# Patient Record
Sex: FEMALE | Race: NATIVE HAWAIIAN OR OTHER PACIFIC ISLANDER | Employment: STUDENT | URBAN - METROPOLITAN AREA
[De-identification: names, ages, dates, MRNs, and addresses within clinical notes are randomized per-mention and may not be internally consistent; named-entity substitution may affect disease eponyms.]

---

## 2024-07-25 ENCOUNTER — OFFICE VISIT (OUTPATIENT)
Dept: ORTHOPEDIC SURGERY | Age: 19
End: 2024-07-25

## 2024-07-25 VITALS — HEIGHT: 64 IN | BODY MASS INDEX: 19.97 KG/M2 | WEIGHT: 117 LBS

## 2024-07-25 DIAGNOSIS — M25.561 ACUTE PAIN OF RIGHT KNEE: ICD-10-CM

## 2024-07-25 DIAGNOSIS — M65.9 SYNOVITIS OF RIGHT FOOT: ICD-10-CM

## 2024-07-25 DIAGNOSIS — M22.2X1 PATELLOFEMORAL PAIN SYNDROME OF RIGHT KNEE: ICD-10-CM

## 2024-07-25 DIAGNOSIS — M79.671 RIGHT FOOT PAIN: Primary | ICD-10-CM

## 2024-07-25 DIAGNOSIS — M21.41 PES PLANUS OF RIGHT FOOT: ICD-10-CM

## 2024-07-25 PROCEDURE — MISCD110 LATERAL STABILIZER: Performed by: FAMILY MEDICINE

## 2024-07-25 RX ORDER — METHYLPREDNISOLONE 4 MG/1
TABLET ORAL
Qty: 21 KIT | Refills: 0 | Status: SHIPPED | OUTPATIENT
Start: 2024-07-25

## 2024-07-25 RX ORDER — MELOXICAM 15 MG/1
15 TABLET ORAL DAILY
Qty: 30 TABLET | Refills: 3 | Status: SHIPPED | OUTPATIENT
Start: 2024-07-25

## 2024-07-25 NOTE — PATIENT INSTRUCTIONS
Take Medrol first for 6 days. This is a steroid pack. Flip the package over to the foil side and the directions will tell you to start with 6 pills the first day, 5 pills the second day, etc. Please do not take any other anti-inflammatories with the medrol dose esther as this can upset your stomach. If something else is needed, you may take extra strength tylenol.     Once you are finished with the medrol, then you may re-start or start your anti-inflammatory: MELOXICAM 1X/DAY

## 2024-07-25 NOTE — PROGRESS NOTES
obtained today and does not show evidence of obvious acute osseous injury or obvious signs of stress injury.  Right knee AP lateral and sunrise films were obtained today and does show mild tilt on sunrise view without evidence of high-grade degenerative changes or osteochondral injuries.        Assessment : #1.  Nearly 1 year status post persistent symptomatic right great toe and plantar foot pain possibly suggesting plantar plate injury/sesamoiditis with overpronation.  2.  2 years status post episodic symptomatic right knee pain with suspected patellofemoral compression syndrome and overpronation.    Impression:  Encounter Diagnoses   Name Primary?    Right foot pain Yes    Acute pain of right knee     Synovitis of right foot     Pes planus of right foot     Patellofemoral pain syndrome of right knee        Office Procedures:  Orders Placed This Encounter   Procedures    Breg Lateral Stabilizer Knee Brace $60     Patient was supplied a Breg Lateral Stabilizer.  This retail item was supplied to provide functional support and assist in protecting the affected area.      Verbal and written instructions for the use of and application of this item were provided.  The patient was educated and fit by a healthcare professional with expert knowledge and specialization in brace application.  They were instructed to contact the office immediately should the equipment result in increased pain, decreased sensation, increased swelling or worsening of the condition.    XR KNEE RIGHT (3 VIEWS)     Standing Status:   Future     Number of Occurrences:   1     Standing Expiration Date:   7/25/2025     Order Specific Question:   Reason for exam:     Answer:   pain    XR FOOT RIGHT (MIN 3 VIEWS)     Standing Status:   Future     Number of Occurrences:   1     Standing Expiration Date:   7/25/2025     Order Specific Question:   Reason for exam:     Answer:   pain    MRI FOOT RIGHT WO CONTRAST     Standing Status:   Future     Standing

## 2024-08-01 ENCOUNTER — TELEPHONE (OUTPATIENT)
Dept: ORTHOPEDIC SURGERY | Age: 19
End: 2024-08-01

## 2024-08-01 NOTE — TELEPHONE ENCOUNTER
Left voicemail for patient that their MRI has been authorized and that they can call and schedule scan at their convenience. Also told them that they can call and schedule a f/u with Dr. Wesley once they have MRI scheduled, leaving at least 2-3 days for our office to receive their results.

## 2024-08-08 ENCOUNTER — OFFICE VISIT (OUTPATIENT)
Dept: ORTHOPEDIC SURGERY | Age: 19
End: 2024-08-08
Payer: COMMERCIAL

## 2024-08-08 DIAGNOSIS — M65.9 SYNOVITIS OF RIGHT FOOT: ICD-10-CM

## 2024-08-08 DIAGNOSIS — M79.671 RIGHT FOOT PAIN: ICD-10-CM

## 2024-08-08 DIAGNOSIS — M21.41 PES PLANUS OF RIGHT FOOT: ICD-10-CM

## 2024-08-08 DIAGNOSIS — M25.561 ACUTE PAIN OF RIGHT KNEE: Primary | ICD-10-CM

## 2024-08-08 DIAGNOSIS — S93.521A TURF TOE OF RIGHT FOOT: ICD-10-CM

## 2024-08-08 DIAGNOSIS — M22.2X1 PATELLOFEMORAL PAIN SYNDROME OF RIGHT KNEE: ICD-10-CM

## 2024-08-08 PROCEDURE — 99213 OFFICE O/P EST LOW 20 MIN: CPT | Performed by: FAMILY MEDICINE

## 2024-08-08 NOTE — PROGRESS NOTES
Chief Complaint  Results (TR MRI FOOT )    FU ongoing chronic right anterior knee pain and progressive persistent right great toe pain with possible turf toe.  Review of right foot imaging    Follow-up right knee suspected patellofemoral compression syndrome    History of Present Illness:  Jimmy Mina is a 19 y.o. female who is a very pleasant and elijah  for [employer] who is originally from [place] and is being seen today upon self-referral and referral from Diomedes Mac her boyfriend for evaluation of ongoing pain to her right foot particular big toe and ongoing pain to her right knee.  Once again she does play [employer] in lacrosse and has noticed significant pain of almost a year to the plantar aspect of her right great toe.  She does not recall any specific history of injury or new activity and was treated with a boot for several weeks with improvement of her symptoms however after stopping the boot she did have recurrence of pain which is still quite problematic with a constant ache at 3 to 4-10 but forceful running and toeing off can produce 7-8 out of 10 pain.  She has been trying to condition and getting ready for lacrosse and going back to school with on roughly 8/18/2024 but has been limited due to pain in her foot.  She was evaluated by her  and told she might be experiencing turf toe but outside of the boot and some anti-inflammatories she has been simply living with this and her symptoms do seem to be progressively worsening pain it can at times even palpating with routine walking and toeing off at 6 out of 10.  She may have noticed some very mild swelling.  She has not had any type of MRI imaging and apparently has not seen the team physician at [facility].  She does have an additional complaint of ongoing pain for the past couple years primarily to the anterior portion of her right knee.  There is no history of injury to activity most of her pain